# Patient Record
Sex: FEMALE | Race: WHITE | NOT HISPANIC OR LATINO | Employment: FULL TIME | URBAN - METROPOLITAN AREA
[De-identification: names, ages, dates, MRNs, and addresses within clinical notes are randomized per-mention and may not be internally consistent; named-entity substitution may affect disease eponyms.]

---

## 2018-06-25 ENCOUNTER — TRANSFERRED RECORDS (OUTPATIENT)
Dept: HEALTH INFORMATION MANAGEMENT | Facility: CLINIC | Age: 48
End: 2018-06-25

## 2018-06-25 LAB — PAP-ABSTRACT: NORMAL

## 2018-06-26 ENCOUNTER — TRANSFERRED RECORDS (OUTPATIENT)
Dept: HEALTH INFORMATION MANAGEMENT | Facility: CLINIC | Age: 48
End: 2018-06-26

## 2019-11-21 ENCOUNTER — OFFICE VISIT (OUTPATIENT)
Dept: FAMILY MEDICINE | Facility: CLINIC | Age: 49
End: 2019-11-21
Payer: COMMERCIAL

## 2019-11-21 VITALS
TEMPERATURE: 97.8 F | HEIGHT: 67 IN | RESPIRATION RATE: 16 BRPM | HEART RATE: 73 BPM | WEIGHT: 180.8 LBS | SYSTOLIC BLOOD PRESSURE: 118 MMHG | BODY MASS INDEX: 28.38 KG/M2 | DIASTOLIC BLOOD PRESSURE: 79 MMHG

## 2019-11-21 DIAGNOSIS — B35.4 DERMATOPHYTOSIS OF BODY: Primary | ICD-10-CM

## 2019-11-21 PROCEDURE — 99213 OFFICE O/P EST LOW 20 MIN: CPT | Performed by: FAMILY MEDICINE

## 2019-11-21 RX ORDER — FLUCONAZOLE 150 MG/1
150 TABLET ORAL ONCE
Qty: 1 TABLET | Refills: 0 | Status: SHIPPED | OUTPATIENT
Start: 2019-11-21 | End: 2019-11-21

## 2019-11-21 ASSESSMENT — PAIN SCALES - GENERAL: PAINLEVEL: NO PAIN (0)

## 2019-11-21 ASSESSMENT — MIFFLIN-ST. JEOR: SCORE: 1472.73

## 2019-11-21 NOTE — PROGRESS NOTES
"SUBJECTIVE:                                                    Malissa Erazo is a 49 year old female who presents to clinic today for the following health issues:    Chief Complaint   Patient presents with     Establish Care     Derm Problem     dry patch on scalp.       Concern - dry spot on scalp  Onset: a couple months    Description:   She says the spot is itchy and reminds her or psoriasis. She says it is raised a little bit.     Intensity: moderate    Progression of Symptoms:  Worsening slowly    Accompanying Signs & Symptoms:  none    Previous history of similar problem:   none    Precipitating factors:   Worsened by: none    Alleviating factors:  Improved by: possibly bacitracin. She says she would apply it before bed and maybe helped with the itching    Therapies Tried and outcome: possibly bacitracin. She says she would apply it before bed and maybe helped with the itching    Problem list and histories reviewed & adjusted, as indicated.  Additional history:     There is no problem list on file for this patient.    History reviewed. No pertinent surgical history.    Social History     Tobacco Use     Smoking status: Current Every Day Smoker     Smokeless tobacco: Never Used   Substance Use Topics     Alcohol use: Yes     Comment: occassional     History reviewed. No pertinent family history.      No current outpatient medications on file.     No Known Allergies    ROS:  Constitutional, HEENT, cardiovascular, pulmonary, gi and gu systems are negative, except as otherwise noted.    OBJECTIVE:                                                    /79   Pulse 73   Temp 97.8  F (36.6  C) (Tympanic)   Resp 16   Ht 1.702 m (5' 7\")   Wt 82 kg (180 lb 12.8 oz)   BMI 28.32 kg/m   Body mass index is 28.32 kg/m .   GENERAL: healthy, alert, well nourished, well hydrated, no distress  HENT: ear canals- normal; TMs- normal; Nose- normal; Mouth- no ulcers, no lesions  NECK: no tenderness, no adenopathy, no " asymmetry, no masses, no stiffness; thyroid- normal to palpation  RESP: lungs clear to auscultation - no rales, no rhonchi, no wheezes  CV: regular rates and rhythm, normal S1 S2, no S3 or S4 and no murmur, no click or rub -  ABDOMEN: soft, no tenderness, no  hepatosplenomegaly, no masses, normal bowel sounds  Skin has red raised edges rash back of neck.       ASSESSMENT/PLAN:                                                      (B35.4) Dermatophytosis of body  (primary encounter diagnosis)  Plan: fluconazole (DIFLUCAN) 150 MG tablet    Patient Instructions   Lamisil cream twice  A day for 3 weeks.          reports that she has been smoking. She has never used smokeless tobacco.  Tobacco Cessation Action Plan: Information offered: Patient not interested at this time          [facility]

## 2019-12-30 ENCOUNTER — TELEPHONE (OUTPATIENT)
Dept: FAMILY MEDICINE | Facility: CLINIC | Age: 49
End: 2019-12-30

## 2019-12-30 DIAGNOSIS — Z12.11 SPECIAL SCREENING FOR MALIGNANT NEOPLASMS, COLON: Primary | ICD-10-CM

## 2020-02-13 ENCOUNTER — TELEPHONE (OUTPATIENT)
Dept: FAMILY MEDICINE | Facility: CLINIC | Age: 50
End: 2020-02-13

## 2024-11-21 ENCOUNTER — OFFICE VISIT (OUTPATIENT)
Dept: FAMILY MEDICINE | Facility: CLINIC | Age: 54
End: 2024-11-21
Payer: COMMERCIAL

## 2024-11-21 ENCOUNTER — E-CONSULT (OUTPATIENT)
Dept: UROLOGY | Facility: CLINIC | Age: 54
End: 2024-11-21

## 2024-11-21 ENCOUNTER — TELEPHONE (OUTPATIENT)
Dept: FAMILY MEDICINE | Facility: CLINIC | Age: 54
End: 2024-11-21

## 2024-11-21 VITALS
WEIGHT: 194.6 LBS | BODY MASS INDEX: 30.54 KG/M2 | OXYGEN SATURATION: 98 % | DIASTOLIC BLOOD PRESSURE: 84 MMHG | RESPIRATION RATE: 16 BRPM | SYSTOLIC BLOOD PRESSURE: 139 MMHG | HEIGHT: 67 IN | HEART RATE: 81 BPM | TEMPERATURE: 97.7 F

## 2024-11-21 DIAGNOSIS — Z12.31 VISIT FOR SCREENING MAMMOGRAM: ICD-10-CM

## 2024-11-21 DIAGNOSIS — R10.31 RLQ ABDOMINAL PAIN: ICD-10-CM

## 2024-11-21 DIAGNOSIS — R93.429 ABNORMAL COMPUTED TOMOGRAPHY OF KIDNEY: Primary | ICD-10-CM

## 2024-11-21 DIAGNOSIS — R93.429 ABNORMAL CT SCAN, KIDNEY: Primary | ICD-10-CM

## 2024-11-21 DIAGNOSIS — Z12.11 SCREEN FOR COLON CANCER: ICD-10-CM

## 2024-11-21 NOTE — PROGRESS NOTES
11/21/2024     E-Consult has been accepted.    Interprofessional consultation requested by:  Luzma Hope PA-C      Clinical Question/Purpose: MY CLINICAL QUESTION IS: abnormal incidental finding on CT , no symptoms, wondering if needs further f/u imaging or workup? Report read ? Microabscesses but didn't fit clinical picture. Patient has no symptoms now.     Patient assessment and information reviewed: reviewed CT report and ER notes, the CT is not in PACS    1.  Abnormal appearance of the right kidney, which shows heterogeneous enhancement. There are fluid density areas within the periphery of the upper pole and lower pole. Small microabscesses in the setting of pyelonephritis cannot be excluded. Recommend correlation with urinalysis. If there is abnormal urinalysis and the patient is diagnosed with pyelonephritis and fails to respond to treatment, reassessment with CT is recommended to exclude progressive renal abscess.   2.  Appendix is normal.   3.  Scattered colonic diverticula without diverticulitis.     Recommendations:   Reassuring that the patient is asymptomatic at this time. [place] UA at time of ER visit but this does not totally rule out pyelonephritis, but given that she improved without abx probably not the case. On the differential diagnosis of heterogenous enhancement of the kidney with peripheral lesions is embolic disease, it does not appear this patient has history of afib or clotting disorder    recommend repeat contrasted imaging in about 1-2 weeks from now (CT urogram) to reassess the kidneys. If totally normal then no further followup needed but if persistent abnormality get formal urologic consultation      The recommendations provided in this E-Consult are based on a review of clinical data pertinent to the clinical question presented, without a review of the patient's complete medical record or, the benefit of a comprehensive in-person or virtual patient evaluation. This  consultation should not replace the clinical judgement and evaluation of the provider ordering this E-Consult. Any new clinical issues, or changes in patient status since the filing of this E-Consult will need to be taken into account when assessing these recommendations. Please contact me if you have further questions.    My total time spent reviewing clinical information and formulating assessment was 5 minutes.        Ankush Barrett MD

## 2024-11-21 NOTE — PROGRESS NOTES
Assessment & Plan     Abnormal CT scan, kidney  See hpi  I will f/u with e-consult and if anything further is recommended  Will recheck creatinine at physical she is going to schedule physical as overdue and will come fasting as well  - Adult E-Consult to Urology (Outpt Provider to Specialist Written Question & Response)    RLQ abdominal pain  Resolved, incidental abnormal ct finding    Visit for screening mammogram    - MA Screening Bilateral w/ Lauro; Future    Screen for colon cancer    - Colonoscopy Screening  Referral; Future    The longitudinal plan of care for the diagnosis(es)/condition(s) as documented were addressed during this visit. Due to the added complexity in care, I will continue to support Malissa in the subsequent management and with ongoing continuity of care.        Subjective   Malissa is a 54 year old, presenting for the following health issues:  Hospital F/U        11/21/2024    11:11 AM   Additional Questions   Roomed by Chayito CORDERO CMA   Accompanied by alone         11/21/2024    11:11 AM   Patient Reported Additional Medications   Patient reports taking the following new medications None     HPI     Mammogram or colon screening or pap done recently? No is overdue. Would like orders for this.   Used to go to [facility]. No recent pcp.     Here for er visit. Feels good today.     ER Follow-up Visit:    Hospital/Nursing Home/IP Rehab Facility: [facility] Emergency [facility]   Date of Admission: 11/14/24  Date of Discharge: 11/15/24  Reason(s) for Admission:   1. Abdominal pain, unspecified abdominal location R10.9   2. Kidney lesion N28.9   Was the patient in the ICU or did the patient experience delirium during hospitalization?  No  Do you have any other stressors you would like to discuss with your provider? No    Problems taking medications regularly:  NA  Medication changes since discharge: None  Problems adhering to non-medication therapy:  None    Summary of hospitalization:   "CareEverywhere information obtained and reviewed  Diagnostic Tests/Treatments reviewed.  Follow up needed: unsure regarding ct, did econsult to urology for guidance with this  Other Healthcare Providers Involved in Patient s Care:         None  Update since discharge: improved.     Had bloating/abdominal pain and cramping the day of. Went into er because she was worried about appendicitis per patient. Went away after leaving the ER. Never had any flank pain or urinary symptoms or fever. No diarrhea or other symptoms.     Plan of care communicated with patient      Copied from er notes:  \"Malissa Erazo is a 54 y.o. female who presents to the emergency department for evaluation of abdominal pain. She reports having an onset of sharp right lower quadrant abdominal pain earlier today around 1300, accompanied by some nausea. She states that her nausea has since subsided, but now has more generalized cramping abdominal pain. She notes feeling well prior to this pain. She notes that she researched on [employer] and became concerned about her appendix. She denies any dysuria, hematuria, urgency, frequency, vomiting. She feels that her symptoms have significantly improved. Has not noted any rashes. She denies chest pain or shortness of breath.   [facility]   Malissa Erazo is a 54 y.o. female who presents to the emergency department for evaluation of abdominal pain.    Vital signs largely unremarkable. Abdominal exam benign. She has had symptoms for less than 11 hours. Labs were obtained and were largely unremarkable; creatinine and bicarbonate noted. UA unrevealing. She was sent for CT imaging that revealed no evidence of appendicitis (her main concern). Incidentally was noted to have some lesions in the right kidney. She has had no urinary symptoms. She has no CVA tenderness. She has had no unexplained fever. She felt that the symptoms have improved.    At this time the etiology of her abdominal pain is unclear. She is not having " "any abdominal tenderness at this time and there is no evidence of appendicitis. No evidence of bowel obstruction, perforated viscus, intestinal inflammation. History, presentation and exam are not consistent with ovarian torsion. She is not having any urinary symptoms (UA sent from triage). We did discuss the possibility that the area within the right kidney could be micro abscesses but currently this does not fit with her presentation. We will have her follow-up with a primary provider; however, she understands to return if she develops fever, shaking chills, unusual rash, flank pain. This information was discussed with the patient and she is in agreement with this plan.    Disposition   The patient was discharged.    Diagnosis   ENCOUNTER DIAGNOSES   ICD-10-CM   1. Abdominal pain, unspecified abdominal location R10.9     2. Kidney lesion N28.9 \"    CT of kidney:  1. Abnormal appearance of the right kidney, which shows heterogeneous enhancement. There are fluid density areas within the periphery of the upper pole and lower pole. Small microabscesses in the setting of pyelonephritis cannot be excluded. Recommend correlation with urinalysis. If there is abnormal urinalysis and the patient is diagnosed with pyelonephritis and fails to respond to treatment, reassessment with CT is recommended to exclude progressive renal abscess. 2. Appendix is normal. 3. Scattered colonic diverticula without diverticulitis.   I, Ankush Mckeon, am serving as a scribe to document services personally performed by Angelito Peng MD, based on my observations and the provider's statements to me.    11/14/2024  [facility] EMERGENCY ROOM         Objective    /84   Pulse 81   Temp 97.7  F (36.5  C) (Temporal)   Resp 16   Ht 1.702 m (5' 7\")   Wt 88.3 kg (194 lb 9.6 oz)   SpO2 98%   BMI 30.48 kg/m    Body mass index is 30.48 kg/m .  Physical Exam   GENERAL: alert and no distress  RESP: lungs clear to auscultation - no rales, " rhonchi or wheezes  CV: regular rate and rhythm, normal S1 S2, no S3 or S4, no murmur, click or rub, no peripheral edema  MS: no gross musculoskeletal defects noted, no edema  PSYCH: mentation appears normal, affect normal/bright  Abdomen: no flank pain or tenderness          Signed Electronically by: Luzma Hope PA-C

## 2024-11-21 NOTE — TELEPHONE ENCOUNTER
I called and spoke to patient, relayed provider's message regarding CT urogram.    She will call to set up.    Iliana FRIAS RN  [facility] Triage

## 2024-11-21 NOTE — TELEPHONE ENCOUNTER
PLEASE CALL PATIENT:   Urologist got back to me on e-consult. They reviewed your chart. They recommend doing a CT urogram (with contrast) to look and see if the abnormality persists in about 2 weeks from now. If it does they recommend I refer you to them formally, but if this imaging is normal then no further workup needed. I ordered this for you. Call imaging to schedule  at [phone] or [phone] Toll-free      Luzma Hope PA-C

## 2024-12-02 ENCOUNTER — ANCILLARY PROCEDURE (OUTPATIENT)
Dept: CT IMAGING | Facility: CLINIC | Age: 54
End: 2024-12-02
Attending: PHYSICIAN ASSISTANT
Payer: COMMERCIAL

## 2024-12-02 DIAGNOSIS — R93.429 ABNORMAL CT SCAN, KIDNEY: ICD-10-CM

## 2024-12-02 PROCEDURE — 74178 CT ABD&PLV WO CNTR FLWD CNTR: CPT | Mod: TC | Performed by: RADIOLOGY

## 2024-12-02 RX ORDER — IOPAMIDOL 755 MG/ML
100 INJECTION, SOLUTION INTRAVASCULAR ONCE
Status: COMPLETED | OUTPATIENT
Start: 2024-12-02 | End: 2024-12-02

## 2024-12-02 RX ADMIN — IOPAMIDOL 100 ML: 755 INJECTION, SOLUTION INTRAVASCULAR at 13:08

## 2024-12-03 DIAGNOSIS — R93.429 ABNORMAL CT SCAN, KIDNEY: Primary | ICD-10-CM

## 2024-12-03 NOTE — RESULT ENCOUNTER NOTE
PLEASE CALL PATIENT:  Dear Malissa,      It was a pleasure to see you at your recent office visit.  Your test results are listed below.  Kidney imaging  again showed the mild patchy enhancement of your right kidney.  Per our e-consult, as abnormality has persisted I have referred you to urology for a more formal evaluation per their recommendations.      If you have any questions or concerns, please call the clinic at [phone].    Sincerely,  Luzma Hope PA-C

## 2024-12-12 ENCOUNTER — TELEPHONE (OUTPATIENT)
Dept: GASTROENTEROLOGY | Facility: CLINIC | Age: 54
End: 2024-12-12
Payer: COMMERCIAL

## 2024-12-12 NOTE — TELEPHONE ENCOUNTER
"Endoscopy Scheduling Screen    Have you had any respiratory illness or flu-like symptoms in the last 10 days?  No      What is your communication preference for Instructions and/or Bowel Prep?   MyChart      What insurance is in the chart?  Other:  [employer]ompson    Ordering/Referring Provider: NIURKA CORNELL   (If ordering provider performs procedure, schedule with ordering provider unless otherwise instructed. )    BMI: Estimated body mass index is 30.48 kg/m  as calculated from the following:    Height as of 11/21/24: 1.702 m (5' 7\").    Weight as of 11/21/24: 88.3 kg (194 lb 9.6 oz).       Sedation Ordered  moderate sedation.   If patient BMI > 50 do not schedule in [facility].    If patient BMI > 45 do not schedule at [facility].    Are you taking methadone or Suboxone?  NO, No RN review required.    Have you been diagnosed and are being treated for severe PTSD or severe anxiety?  NO, No RN review required.    Are you taking any prescription medications for pain 3 or more times per week?   NO, No RN review required.      Do you have a history of malignant hyperthermia?  No      (Females) Are you currently pregnant?        Have you been diagnosed or told you have pulmonary hypertension?   No      Do you have an LVAD?  No    Have you been told you have moderate to severe sleep apnea?  No.      Have you been told you have COPD, asthma, or any other lung disease?  No      Do you have any heart conditions?  No       Have you ever had or are you waiting for an organ transplant?  No. Continue scheduling, no site restrictions.      Have you had a stroke or transient ischemic attack (TIA aka \"mini stroke\" in the last 6 months?   No    Have you been diagnosed with or been told you have cirrhosis of the liver?   No.      Are you currently on dialysis?   No    Do you need assistance transferring?   No    BMI: Estimated body mass index is 30.48 kg/m  as calculated from the following:    Height as of 11/21/24: 1.702 m (5' 7\").    Weight as " of 11/21/24: 88.3 kg (194 lb 9.6 oz).     Is patients BMI > 40 and scheduling location [facility]?  No    Do you take an injectable or oral medication for weight loss or diabetes (excluding insulin)?  No      Do you take the medication Naltrexone?  No      Do you take blood thinners?  No       Prep   Are you currently on dialysis or do you have chronic kidney disease?  No      Do you have a diagnosis of diabetes?  No      Do you have a diagnosis of cystic fibrosis (CF)?  No    On a regular basis do you go 3 -5 days between bowel movements?  No      BMI > 40?  No    Preferred Pharmacy:    [id number] - [place], [place] - [address]  [address]  [facility] [address]  Phone: [phone] Fax: [phone]      Final Scheduling Details     Procedure scheduled  Colonoscopy    Surgeon:  BRIAN    Date of procedure:  1/3/25     Pre-OP / PAC:   No - Not required for this site.    Location  [facility] - [facility] - Patient preference.    Sedation   Moderate Sedation - Per order.      Patient Reminders:   You will receive a call from a Nurse to review instructions and health history.  This assessment must be completed prior to your procedure.  Failure to complete the Nurse assessment may result in the procedure being cancelled.      On the day of your procedure, please designate an adult(s) who can drive you home stay with you for the next 24 hours. The medicines used in the exam will make you sleepy. You will not be able to drive.      You cannot take public transportation, ride share services, or non-medical taxi service without a responsible caregiver.  Medical transport services are allowed with the requirement that a responsible caregiver will receive you at your destination.  We require that drivers and caregivers are confirmed prior to your procedure.

## 2024-12-16 ENCOUNTER — TELEPHONE (OUTPATIENT)
Dept: GASTROENTEROLOGY | Facility: CLINIC | Age: 54
End: 2024-12-16
Payer: COMMERCIAL

## 2024-12-16 NOTE — TELEPHONE ENCOUNTER
Pre assessment completed for upcoming procedure.   (Please see previous telephone encounter notes for complete details)        Procedure details:    Approximate time and facility location reviewed.   Patient is aware that endoscopy team will be calling about 2 days prior to confirm arrival time.    Designated  policy reviewed and that  requests drivers to check in and stay on campus.   *Disclaimer - please notify the  RN GI staff with any  issues/concerns.     Instructed to have someone stay 6  hours post procedure.     Medication review:    Medications reviewed. Please see supporting documentation below. Holding recommendations discussed (if applicable).       Prep for procedure:     Procedure prep instructions reviewed.        Any additional information needed:  N/A      Patient verbalized understanding and had no questions or concerns at this time.      Bouchra Garcia LPN  Endoscopy Procedure Pre Assessment   [phone] option 2

## 2024-12-16 NOTE — TELEPHONE ENCOUNTER
Pre visit planning completed.      Procedure details:    Patient scheduled for Colonoscopy on 1/3/25.     Approximate arrival time: 1340. Procedure time 1425.   *Ensure patient is aware that endoscopy team will be calling about 2 days prior to procedure date to confirm arrival time as this may change.     Facility location: [facility]; [address], [place] [address]. Check in location: 2nd Floor at Surgery desk.  *Disclaimer: Drivers are to check in with patient and stay on campus during procedure.     Sedation type: Conscious sedation     Pre op exam needed? No.    Indication for procedure:   Screen for colon cancer [Z         Chart review:     Electronic implanted devices? No    Recent diagnosis of diverticulitis within the last 6 weeks? No      Medication review:    Diabetic? No    Anticoagulants? No    Weight loss medication/injectable? No GLP-1 medication per patient's medication list. Nursing to verify with pre-assessment call.    Other medication HOLDING recommendations:  N/A      Prep for procedure:     Bowel prep recommendation: Standard Miralax.   Due to: standard bowel prep    Procedure information and instructions sent via Mobile Authentication         Corinne Kliber, RN  Endoscopy Procedure Pre Assessment   [phone] option 2

## 2024-12-19 ENCOUNTER — OFFICE VISIT (OUTPATIENT)
Dept: FAMILY MEDICINE | Facility: CLINIC | Age: 54
End: 2024-12-19
Payer: COMMERCIAL

## 2024-12-19 VITALS
DIASTOLIC BLOOD PRESSURE: 84 MMHG | OXYGEN SATURATION: 99 % | RESPIRATION RATE: 16 BRPM | HEIGHT: 67 IN | BODY MASS INDEX: 30.7 KG/M2 | SYSTOLIC BLOOD PRESSURE: 128 MMHG | TEMPERATURE: 97.9 F | WEIGHT: 195.6 LBS | HEART RATE: 87 BPM

## 2024-12-19 DIAGNOSIS — Z13.29 SCREENING FOR THYROID DISORDER: ICD-10-CM

## 2024-12-19 DIAGNOSIS — Z00.00 ROUTINE GENERAL MEDICAL EXAMINATION AT A HEALTH CARE FACILITY: Primary | ICD-10-CM

## 2024-12-19 DIAGNOSIS — Z12.2 SCREENING FOR LUNG CANCER: ICD-10-CM

## 2024-12-19 DIAGNOSIS — Z11.59 NEED FOR HEPATITIS C SCREENING TEST: ICD-10-CM

## 2024-12-19 DIAGNOSIS — Z11.4 SCREENING FOR HIV (HUMAN IMMUNODEFICIENCY VIRUS): ICD-10-CM

## 2024-12-19 DIAGNOSIS — Z12.4 CERVICAL CANCER SCREENING: ICD-10-CM

## 2024-12-19 DIAGNOSIS — E34.9 HORMONE IMBALANCE: ICD-10-CM

## 2024-12-19 DIAGNOSIS — Z13.220 SCREENING CHOLESTEROL LEVEL: ICD-10-CM

## 2024-12-19 DIAGNOSIS — Z87.891 FORMER SMOKER: ICD-10-CM

## 2024-12-19 DIAGNOSIS — Z87.42 HX OF ABNORMAL CERVICAL PAP SMEAR: ICD-10-CM

## 2024-12-19 DIAGNOSIS — E56.9 VITAMIN DEFICIENCY: ICD-10-CM

## 2024-12-19 DIAGNOSIS — Z13.1 SCREENING FOR DIABETES MELLITUS: ICD-10-CM

## 2024-12-19 DIAGNOSIS — B35.1 ONYCHOMYCOSIS DUE TO DERMATOPHYTE: ICD-10-CM

## 2024-12-19 LAB
ALBUMIN SERPL BCG-MCNC: 4.3 G/DL (ref 3.5–5.2)
ALP SERPL-CCNC: 96 U/L (ref 40–150)
ALT SERPL W P-5'-P-CCNC: 16 U/L (ref 0–50)
ANION GAP SERPL CALCULATED.3IONS-SCNC: 11 MMOL/L (ref 7–15)
AST SERPL W P-5'-P-CCNC: 23 U/L (ref 0–45)
BILIRUB SERPL-MCNC: 0.4 MG/DL
BUN SERPL-MCNC: 11.5 MG/DL (ref 6–20)
CALCIUM SERPL-MCNC: 9.5 MG/DL (ref 8.8–10.4)
CHLORIDE SERPL-SCNC: 108 MMOL/L (ref 98–107)
CHOLEST SERPL-MCNC: 218 MG/DL
CREAT SERPL-MCNC: 0.95 MG/DL (ref 0.51–0.95)
EGFRCR SERPLBLD CKD-EPI 2021: 70 ML/MIN/1.73M2
ESTRADIOL SERPL-MCNC: <5 PG/ML
FASTING STATUS PATIENT QL REPORTED: YES
FASTING STATUS PATIENT QL REPORTED: YES
FSH SERPL IRP2-ACNC: 58.8 MIU/ML
GLUCOSE SERPL-MCNC: 93 MG/DL (ref 70–99)
HCO3 SERPL-SCNC: 23 MMOL/L (ref 22–29)
HCV AB SERPL QL IA: NONREACTIVE
HDLC SERPL-MCNC: 68 MG/DL
HIV 1+2 AB+HIV1 P24 AG SERPL QL IA: NONREACTIVE
IRON BINDING CAPACITY (ROCHE): 276 UG/DL (ref 240–430)
IRON SATN MFR SERPL: 38 % (ref 15–46)
IRON SERPL-MCNC: 105 UG/DL (ref 37–145)
LDLC SERPL CALC-MCNC: 129 MG/DL
MAGNESIUM SERPL-MCNC: 2.3 MG/DL (ref 1.7–2.3)
NONHDLC SERPL-MCNC: 150 MG/DL
POTASSIUM SERPL-SCNC: 4.1 MMOL/L (ref 3.4–5.3)
PROGEST SERPL-MCNC: 0.3 NG/ML
PROT SERPL-MCNC: 6.8 G/DL (ref 6.4–8.3)
SHBG SERPL-SCNC: 59 NMOL/L (ref 30–135)
SODIUM SERPL-SCNC: 142 MMOL/L (ref 135–145)
TRIGL SERPL-MCNC: 105 MG/DL
TSH SERPL DL<=0.005 MIU/L-ACNC: 1.71 UIU/ML (ref 0.3–4.2)
VIT B12 SERPL-MCNC: 397 PG/ML (ref 232–1245)
VIT D+METAB SERPL-MCNC: 27 NG/ML (ref 20–50)

## 2024-12-19 RX ORDER — TERBINAFINE HYDROCHLORIDE 250 MG/1
250 TABLET ORAL DAILY
Qty: 90 TABLET | Refills: 0 | Status: SHIPPED | OUTPATIENT
Start: 2024-12-19 | End: 2025-03-19

## 2024-12-19 SDOH — HEALTH STABILITY: PHYSICAL HEALTH: ON AVERAGE, HOW MANY DAYS PER WEEK DO YOU ENGAGE IN MODERATE TO STRENUOUS EXERCISE (LIKE A BRISK WALK)?: 0 DAYS

## 2024-12-19 ASSESSMENT — SOCIAL DETERMINANTS OF HEALTH (SDOH): HOW OFTEN DO YOU GET TOGETHER WITH FRIENDS OR RELATIVES?: ONCE A WEEK

## 2024-12-19 NOTE — PROGRESS NOTES
Preventive Care Visit  [facility] THANIA PEREZ, Family Medicine  Dec 19, 2024      Assessment & Plan     Routine general medical examination at a health care facility      Cervical cancer screening    - HPV and Gynecologic Cytology Panel - Recommended Age 30 - 65 Years    Screening for HIV (human immunodeficiency virus)    - HIV Antigen Antibody Combo; Future  - HIV Antigen Antibody Combo    Need for hepatitis C screening test    - Hepatitis C Screen Reflex to HCV RNA Quant and Genotype; Future  - Hepatitis C Screen Reflex to HCV RNA Quant and Genotype    Hormone imbalance    - Estradiol; Future  - Testosterone Free and Total; Future  - Follicle stimulating hormone; Future  - Progesterone; Future  - Progesterone  - Follicle stimulating hormone  - Testosterone Free and Total  - Estradiol    Vitamin deficiency    - Vitamin D Deficiency; Future  - Vitamin B12; Future  - Magnesium; Future  - Iron and iron binding capacity; Future  - Iron and iron binding capacity  - Magnesium  - Vitamin B12  - Vitamin D Deficiency    Screening for thyroid disorder    - TSH with free T4 reflex; Future  - TSH with free T4 reflex    Screening cholesterol level    - Lipid panel reflex to direct LDL Non-fasting; Future  - Lipid panel reflex to direct LDL Non-fasting    Screening for diabetes mellitus    - Comprehensive metabolic panel (BMP + Alb, Alk Phos, ALT, AST, Total. Bili, TP); Future  - Comprehensive metabolic panel (BMP + Alb, Alk Phos, ALT, AST, Total. Bili, TP)    Onychomycosis due to dermatophyte    - Comprehensive metabolic panel (BMP + Alb, Alk Phos, ALT, AST, Total. Bili, TP); Future  - terbinafine (LAMISIL) 250 MG tablet; Take 1 tablet (250 mg) by mouth daily.  - Comprehensive metabolic panel (BMP + Alb, Alk Phos, ALT, AST, Total. Bili, TP)    Former smoker    - CT Chest Lung Cancer Scrn Low Dose wo; Future    Screening for lung cancer    - CT Chest Lung Cancer Scrn Low Dose wo;  "Future    Patient has been advised of split billing requirements and indicates understanding: Yes        BMI  Estimated body mass index is 30.81 kg/m  as calculated from the following:    Height as of this encounter: 1.697 m (5' 6.81\").    Weight as of this encounter: 88.7 kg (195 lb 9.6 oz).   Weight management plan: Discussed healthy diet and exercise guidelines    Counseling  Appropriate preventive services were addressed with this patient via screening, questionnaire, or discussion as appropriate for fall prevention, nutrition, physical activity, Tobacco-use cessation, social engagement, weight loss and cognition.  Checklist reviewing preventive services available has been given to the patient.  Reviewed patient's diet, addressing concerns and/or questions.   The patient was instructed to see the dentist every 6 months.       Work on weight loss  Regular exercise  See Patient Instructions    Subjective   Malissa is a 54 year old, presenting for the following:  Physical        12/19/2024     9:16 AM   Additional Questions   Roomed by MP         12/19/2024     9:16 AM   Patient Reported Additional Medications   Patient reports taking the following new medications None per patient          She works from home, she likes her job.  She has 2 grown kids, daughter recently .  Mental health good.  Enjoys gardening.  In agreement to hep B and pneumonia vaccine.  In agreement to screening labs and would like below labs done.  In agreement to Pap history of abnormal Pap.  Quit smoking 5 years ago, smoked 20 to 30 years.    HPI  Patient would still like to discuss the following concern(s):  1. Toe fungus  2. Labs-vitamins and hormones          12/19/2024   General Health   How would you rate your overall physical health? Good   Feel stress (tense, anxious, or unable to sleep) Only a little   (!) STRESS CONCERN      12/19/2024   Nutrition   Three or more servings of calcium each day? (!) I DON'T KNOW   Diet: I don't " know   How many servings of fruit and vegetables per day? (!) 2-3   How many sweetened beverages each day? 0-1         12/19/2024   Exercise   Days per week of moderate/strenous exercise 0 days   (!) EXERCISE CONCERN      12/19/2024   Social Factors   Frequency of gathering with friends or relatives Once a week   Worry food won't last until get money to buy more No   Food not last or not have enough money for food? No   Do you have housing? (Housing is defined as stable permanent housing and does not include staying ouside in a car, in a tent, in an abandoned building, in an overnight shelter, or couch-surfing.) Yes   Are you worried about losing your housing? No   Lack of transportation? No   Unable to get utilities (heat,electricity)? No         12/19/2024   Fall Risk   Fallen 2 or more times in the past year? No   Trouble with walking or balance? No          12/19/2024   Dental   Dentist two times every year? (!) NO         12/19/2024   TB Screening   Were you born outside of the [place]? No           Today's PHQ-2 Score:       11/21/2024    11:09 AM   PHQ-2 ( 1999 [employer])   Q1: Little interest or pleasure in doing things 1   Q2: Feeling down, depressed or hopeless 0   PHQ-2 Score 1    Q1: Little interest or pleasure in doing things Several days   Q2: Feeling down, depressed or hopeless Not at all   PHQ-2 Score 1       Patient-reported         12/19/2024   Substance Use   Alcohol more than 3/day or more than 7/wk No   Do you use any other substances recreationally? No     Social History     Tobacco Use    Smoking status: Former     Types: Cigarettes     Passive exposure: Past    Smokeless tobacco: Never   Vaping Use    Vaping status: Never Used   Substance Use Topics    Alcohol use: Yes     Comment: occassional    Drug use: Never          Mammogram Screening - Mammogram every 1-2 years updated in Health Maintenance based on mutual decision making          12/19/2024   One time HIV Screening   Previous HIV test? No          12/19/2024   STI Screening   New sexual partner(s) since last STI/HIV test? No     History of abnormal Pap smear: YES - reflected in Problem List and Health Maintenance accordingly        6/25/2018    12:00 AM   PAP / HPV   PAP-ABSTRACT See Scanned Document           This result is from an external source.     ASCVD Risk   The ASCVD Risk score (López LUCAS, et al., 2019) failed to calculate for the following reasons:    Cannot find a previous HDL lab    Cannot find a previous total cholesterol lab           Reviewed and updated as needed this visit by Provider                    History reviewed. No pertinent past medical history.  History reviewed. No pertinent surgical history.  OB History   No obstetric history on file.     Lab work is in process  Labs reviewed in EPIC  BP Readings from Last 3 Encounters:   12/19/24 128/84   11/21/24 139/84   11/21/19 118/79    Wt Readings from Last 3 Encounters:   12/19/24 88.7 kg (195 lb 9.6 oz)   11/21/24 88.3 kg (194 lb 9.6 oz)   11/21/19 82 kg (180 lb 12.8 oz)                  There is no problem list on file for this patient.    History reviewed. No pertinent surgical history.    Social History     Tobacco Use    Smoking status: Former     Types: Cigarettes     Passive exposure: Past    Smokeless tobacco: Never   Substance Use Topics    Alcohol use: Yes     Comment: occassional     Family History   Problem Relation Age of Onset    Hypertension Mother     Hyperlipidemia Mother     Hypertension Maternal Grandmother     Hyperlipidemia Brother     Cerebrovascular Disease Brother          Current Outpatient Medications   Medication Sig Dispense Refill    terbinafine (LAMISIL) 250 MG tablet Take 1 tablet (250 mg) by mouth daily. 90 tablet 0     No Known Allergies  No lab results found.       Review of Systems  CONSTITUTIONAL: NEGATIVE for fever, chills, change in weight  INTEGUMENTARY/SKIN: NEGATIVE for worrisome rashes, moles or lesions  EYES: NEGATIVE for vision  "changes or irritation  ENT/MOUTH: NEGATIVE for ear, mouth and throat problems  RESP: NEGATIVE for significant cough or SOB  BREAST: NEGATIVE for masses, tenderness or discharge  CV: NEGATIVE for chest pain, palpitations or peripheral edema  GI: NEGATIVE for nausea, abdominal pain, heartburn, or change in bowel habits  : NEGATIVE for frequency, dysuria, or hematuria  MUSCULOSKELETAL: NEGATIVE for significant arthralgias or myalgia  NEURO: NEGATIVE for weakness, dizziness or paresthesias  ENDOCRINE: NEGATIVE for temperature intolerance, skin/hair changes  HEME: NEGATIVE for bleeding problems  PSYCHIATRIC: NEGATIVE for changes in mood or affect     Objective    Exam  /84   Pulse 87   Temp 97.9  F (36.6  C) (Temporal)   Resp 16   Ht 1.697 m (5' 6.81\")   Wt 88.7 kg (195 lb 9.6 oz)   SpO2 99%   BMI 30.81 kg/m     Estimated body mass index is 30.81 kg/m  as calculated from the following:    Height as of this encounter: 1.697 m (5' 6.81\").    Weight as of this encounter: 88.7 kg (195 lb 9.6 oz).    Physical Exam  GENERAL: alert and no distress  EYES: Eyes grossly normal to inspection, PERRL and conjunctivae and sclerae normal  HENT: ear canals and TM's normal, nose and mouth without ulcers or lesions  NECK: no adenopathy, no asymmetry, masses, or scars  RESP: lungs clear to auscultation - no rales, rhonchi or wheezes; discussed lung cancer screening  BREAST: Deferred per guidelines-asymptomatic per patient.  Discussed self breast exam, symptoms to watch out for and when to follow-up.  Has mammogram scheduled today  CV: regular rate and rhythm, normal S1 S2, no S3 or S4, no murmur, click or rub, no peripheral edema  ABDOMEN: soft, nontender, no hepatosplenomegaly, no masses and bowel sounds normal ;has colonoscopy scheduled   (female): normal female external genitalia, normal urethral meatus, normal vaginal mucosa  MS: no gross musculoskeletal defects noted, no edema, no CVA tenderness  SKIN: no suspicious " lesions or rashes POSITIVE thickened, discolored right great nail she would prefer to treat with oral medication.  Discussed liver function test  NEURO: Normal strength and tone, cranial nerves intact, mentation intact and speech normal  PSYCH: mentation appears normal, affect normal/bright  LYMPH: no cervical, supraclavicular, axillary, or inguinal adenopathy        Signed Electronically by: THANIA WELLER

## 2024-12-19 NOTE — PATIENT INSTRUCTIONS
Make sure to complete your mammogram and colonoscopy. If lung cancer screening is covered, please complete.     Patient Education   Preventive Care Advice   This is general advice given by our system to help you stay healthy. However, your care team may have specific advice just for you. Please talk to your care team about your preventive care needs.  Nutrition  Eat 5 or more servings of fruits and vegetables each day.  Try wheat bread, brown rice and whole grain pasta (instead of white bread, rice, and pasta).  Get enough calcium and vitamin D. Check the label on foods and aim for 100% of the RDA (recommended daily allowance).  Lifestyle  Exercise at least 150 minutes each week  (30 minutes a day, 5 days a week).  Do muscle strengthening activities 2 days a week. These help control your weight and prevent disease.  No smoking.  Wear sunscreen to prevent skin cancer.  Have a dental exam and cleaning every 6 months.  Yearly exams  See your health care team every year to talk about:  Any changes in your health.  Any medicines your care team has prescribed.  Preventive care, family planning, and ways to prevent chronic diseases.  Shots (vaccines)   HPV shots (up to age 26), if you've never had them before.  Hepatitis B shots (up to age 59), if you've never had them before.  COVID-19 shot: Get this shot when it's due.  Flu shot: Get a flu shot every year.  Tetanus shot: Get a tetanus shot every 10 years.  Pneumococcal, hepatitis A, and RSV shots: Ask your care team if you need these based on your risk.  Shingles shot (for age 50 and up)  General health tests  Diabetes screening:  Starting at age 35, Get screened for diabetes at least every 3 years.  If you are younger than age 35, ask your care team if you should be screened for diabetes.  Cholesterol test: At age 39, start having a cholesterol test every 5 years, or more often if advised.  Bone density scan (DEXA): At age 50, ask your care team if you should have this  scan for osteoporosis (brittle bones).  Hepatitis C: Get tested at least once in your life.  STIs (sexually transmitted infections)  Before age 24: Ask your care team if you should be screened for STIs.  After age 24: Get screened for STIs if you're at risk. You are at risk for STIs (including HIV) if:  You are sexually active with more than one person.  You don't use condoms every time.  You or a partner was diagnosed with a sexually transmitted infection.  If you are at risk for HIV, ask about PrEP medicine to prevent HIV.  Get tested for HIV at least once in your life, whether you are at risk for HIV or not.  Cancer screening tests  Cervical cancer screening: If you have a cervix, begin getting regular cervical cancer screening tests starting at age 21.  Breast cancer scan (mammogram): If you've ever had breasts, begin having regular mammograms starting at age 40. This is a scan to check for breast cancer.  Colon cancer screening: It is important to start screening for colon cancer at age 45.  Have a colonoscopy test every 10 years (or more often if you're at risk) Or, ask your provider about stool tests like a FIT test every year or Cologuard test every 3 years.  To learn more about your testing options, visit:   .  For help making a decision, visit:   https://bit.ly/xm09815.  Prostate cancer screening test: If you have a prostate, ask your care team if a prostate cancer screening test (PSA) at age 55 is right for you.  Lung cancer screening: If you are a current or former smoker ages 50 to 80, ask your care team if ongoing lung cancer screenings are right for you.  For informational purposes only. Not to replace the advice of your health care provider. Copyright   2023 [employer]. All rights reserved. Clinically reviewed by the [facility] Transitions Program. [employer] [id number] - REV 01/24.

## 2024-12-21 LAB
TESTOST FREE SERPL-MCNC: 0.17 NG/DL
TESTOST SERPL-MCNC: 14 NG/DL (ref 8–60)

## 2024-12-26 LAB
BKR AP ASSOCIATED HPV REPORT: NORMAL
BKR LAB AP GYN ADEQUACY: NORMAL
BKR LAB AP GYN INTERPRETATION: NORMAL
BKR LAB AP PREVIOUS ABNL DX: NORMAL
BKR LAB AP PREVIOUS ABNORMAL: NORMAL
PATH REPORT.COMMENTS IMP SPEC: NORMAL
PATH REPORT.COMMENTS IMP SPEC: NORMAL
PATH REPORT.RELEVANT HX SPEC: NORMAL

## 2024-12-30 ENCOUNTER — ANCILLARY PROCEDURE (OUTPATIENT)
Dept: CT IMAGING | Facility: CLINIC | Age: 54
End: 2024-12-30
Attending: NURSE PRACTITIONER
Payer: COMMERCIAL

## 2024-12-30 DIAGNOSIS — Z12.2 SCREENING FOR LUNG CANCER: ICD-10-CM

## 2024-12-30 DIAGNOSIS — Z87.891 FORMER SMOKER: ICD-10-CM

## 2024-12-30 PROCEDURE — 71271 CT THORAX LUNG CANCER SCR C-: CPT | Mod: TC | Performed by: RADIOLOGY

## 2024-12-31 DIAGNOSIS — E04.1 THYROID NODULE: Primary | ICD-10-CM

## 2024-12-31 NOTE — RESULT ENCOUNTER NOTE
Rupert Leonardo,    Thank you for your recent office visit.    Here are your recent results.  Per radiology:                                                                  IMPRESSION:   1. ACR Assessment Category:  Lung-RADS Category 2. Benign appearance  or behavior. Recommendation: Continue annual screening, if clinically  relevant (please order exam code IMG 2290). .   2. Significant Incidental Finding(s):  Category S: Yes. Left lobe  thyroid nodule measuring 1.5 cm. Recommend thyroid ultrasound.  3. Mildly dilated ascending thoracic aorta measuring 3.9 cm.        Thyroid ultrasound ordered please schedule    Feel free to contact me via [employer] or call the clinic at [phone].    Sincerely,    Gin Grande, BENEDICTO, FNP-BC

## 2025-01-03 ENCOUNTER — HOSPITAL ENCOUNTER (OUTPATIENT)
Facility: AMBULATORY SURGERY CENTER | Age: 55
Discharge: HOME | End: 2025-01-03
Attending: INTERNAL MEDICINE | Admitting: INTERNAL MEDICINE
Payer: COMMERCIAL

## 2025-01-03 VITALS
RESPIRATION RATE: 16 BRPM | TEMPERATURE: 97.1 F | OXYGEN SATURATION: 98 % | HEART RATE: 77 BPM | DIASTOLIC BLOOD PRESSURE: 76 MMHG | SYSTOLIC BLOOD PRESSURE: 115 MMHG

## 2025-01-03 LAB — COLONOSCOPY: NORMAL

## 2025-01-03 PROCEDURE — G8918 PT W/O PREOP ORDER IV AB PRO: HCPCS

## 2025-01-03 PROCEDURE — G8907 PT DOC NO EVENTS ON DISCHARG: HCPCS

## 2025-01-03 PROCEDURE — 45380 COLONOSCOPY AND BIOPSY: CPT

## 2025-01-03 PROCEDURE — 88305 TISSUE EXAM BY PATHOLOGIST: CPT | Performed by: PATHOLOGY

## 2025-01-03 RX ORDER — FLUMAZENIL 0.1 MG/ML
0.2 INJECTION, SOLUTION INTRAVENOUS
Status: DISCONTINUED | OUTPATIENT
Start: 2025-01-03 | End: 2025-01-04 | Stop reason: HOSPADM

## 2025-01-03 RX ORDER — PROCHLORPERAZINE MALEATE 10 MG
10 TABLET ORAL EVERY 6 HOURS PRN
Status: DISCONTINUED | OUTPATIENT
Start: 2025-01-03 | End: 2025-01-04 | Stop reason: HOSPADM

## 2025-01-03 RX ORDER — NALOXONE HYDROCHLORIDE 0.4 MG/ML
0.2 INJECTION, SOLUTION INTRAMUSCULAR; INTRAVENOUS; SUBCUTANEOUS
Status: DISCONTINUED | OUTPATIENT
Start: 2025-01-03 | End: 2025-01-04 | Stop reason: HOSPADM

## 2025-01-03 RX ORDER — ONDANSETRON 2 MG/ML
4 INJECTION INTRAMUSCULAR; INTRAVENOUS
Status: DISCONTINUED | OUTPATIENT
Start: 2025-01-03 | End: 2025-01-04 | Stop reason: HOSPADM

## 2025-01-03 RX ORDER — ONDANSETRON 4 MG/1
4 TABLET, ORALLY DISINTEGRATING ORAL EVERY 6 HOURS PRN
Status: DISCONTINUED | OUTPATIENT
Start: 2025-01-03 | End: 2025-01-04 | Stop reason: HOSPADM

## 2025-01-03 RX ORDER — ONDANSETRON 2 MG/ML
4 INJECTION INTRAMUSCULAR; INTRAVENOUS EVERY 6 HOURS PRN
Status: DISCONTINUED | OUTPATIENT
Start: 2025-01-03 | End: 2025-01-04 | Stop reason: HOSPADM

## 2025-01-03 RX ORDER — NALOXONE HYDROCHLORIDE 0.4 MG/ML
0.4 INJECTION, SOLUTION INTRAMUSCULAR; INTRAVENOUS; SUBCUTANEOUS
Status: DISCONTINUED | OUTPATIENT
Start: 2025-01-03 | End: 2025-01-04 | Stop reason: HOSPADM

## 2025-01-03 RX ORDER — FENTANYL CITRATE 50 UG/ML
INJECTION, SOLUTION INTRAMUSCULAR; INTRAVENOUS PRN
Status: DISCONTINUED | OUTPATIENT
Start: 2025-01-03 | End: 2025-01-03 | Stop reason: HOSPADM

## 2025-01-03 RX ORDER — LIDOCAINE 40 MG/G
CREAM TOPICAL
Status: DISCONTINUED | OUTPATIENT
Start: 2025-01-03 | End: 2025-01-04 | Stop reason: HOSPADM

## 2025-01-07 ENCOUNTER — ANCILLARY PROCEDURE (OUTPATIENT)
Dept: ULTRASOUND IMAGING | Facility: CLINIC | Age: 55
End: 2025-01-07
Attending: NURSE PRACTITIONER
Payer: COMMERCIAL

## 2025-01-07 DIAGNOSIS — E04.1 THYROID NODULE: ICD-10-CM

## 2025-01-07 LAB
PATH REPORT.COMMENTS IMP SPEC: NORMAL
PATH REPORT.COMMENTS IMP SPEC: NORMAL
PATH REPORT.FINAL DX SPEC: NORMAL
PATH REPORT.GROSS SPEC: NORMAL
PATH REPORT.MICROSCOPIC SPEC OTHER STN: NORMAL
PATH REPORT.RELEVANT HX SPEC: NORMAL
PHOTO IMAGE: NORMAL

## 2025-01-07 PROCEDURE — 76536 US EXAM OF HEAD AND NECK: CPT | Mod: TC | Performed by: RADIOLOGY

## 2025-01-07 NOTE — RESULT ENCOUNTER NOTE
Rupert Leonardo,    Thank you for your recent office visit.    Here are your recent results.  Per radiology: IMPRESSION:  1.  Multiple bilateral thyroid nodules as above, largest on the left meets criteria for fine-needle aspiration if not previously performed.  2.  Recommend annual sonographic follow-up for additional bilateral nodules.      Do you want me to order the fine needle aspiration?  It looks like you had one done in 2011?    Feel free to contact me via [employer] or call the clinic at [phone].    Sincerely,    Gin Grande, BENEDICTO, FNP-BC

## 2025-01-08 ENCOUNTER — TELEPHONE (OUTPATIENT)
Dept: FAMILY MEDICINE | Facility: CLINIC | Age: 55
End: 2025-01-08
Payer: COMMERCIAL

## 2025-01-08 DIAGNOSIS — E04.1 THYROID NODULE: Primary | ICD-10-CM

## 2025-01-08 NOTE — TELEPHONE ENCOUNTER
RN spoke with the patient.     She would like a fine needle aspiration order.     Aminata Cruz RN on 1/8/2025 at 11:26 AM

## 2025-01-08 NOTE — TELEPHONE ENCOUNTER
Rupert Leonardo,     Thank you for your recent office visit.     Here are your recent results.  Per radiology: IMPRESSION:  1.  Multiple bilateral thyroid nodules as above, largest on the left meets criteria for fine-needle aspiration if not previously performed.  2.  Recommend annual sonographic follow-up for additional bilateral nodules.        Do you want me to order the fine needle aspiration?  It looks like you had one done in 2011?     Feel free to contact me via [email or web address] or call the clinic at [phone].     Sincerely,     Gin Grande, APRN, FNP-BC   Written by Gin Virk NP on 1/7/2025  3:13 PM CST

## 2025-01-14 ENCOUNTER — OFFICE VISIT (OUTPATIENT)
Dept: UROLOGY | Facility: CLINIC | Age: 55
End: 2025-01-14
Attending: PHYSICIAN ASSISTANT
Payer: COMMERCIAL

## 2025-01-14 VITALS — DIASTOLIC BLOOD PRESSURE: 87 MMHG | HEART RATE: 77 BPM | SYSTOLIC BLOOD PRESSURE: 124 MMHG | OXYGEN SATURATION: 100 %

## 2025-01-14 DIAGNOSIS — R93.429 ABNORMAL CT SCAN, KIDNEY: ICD-10-CM

## 2025-01-14 DIAGNOSIS — Z87.440 PERSONAL HISTORY OF URINARY TRACT INFECTION: Primary | ICD-10-CM

## 2025-01-14 PROCEDURE — 99243 OFF/OP CNSLTJ NEW/EST LOW 30: CPT | Performed by: UROLOGY

## 2025-01-14 NOTE — PROGRESS NOTES
S: Patient is a pleasant 55-year-old  female who was requested to be seen by Luzma Hope for a consultation with regard to patient's abnormal CT scan.   Patient had some right lower abdominal pain several months ago and was seen emergency room for it.  Patient had a CT scan that showed possible right sided pyelonephritis.  She denies any flank pain, nausea, or vomiting.  She has no fever.  Urinalysis and CBC were normal.  Recently a  CT urogram was done.  She has history of UTIs when she was younger.  Current Outpatient Medications   Medication Sig Dispense Refill    terbinafine (LAMISIL) 250 MG tablet Take 1 tablet (250 mg) by mouth daily. 90 tablet 0     No Known Allergies  No past medical history on file.  Past Surgical History:   Procedure Laterality Date    COLONOSCOPY N/A 1/3/2025    Procedure: Colonoscopy;  Surgeon: Kia Cathernie DO;  Location: [facility] OR    COLONOSCOPY N/A 1/3/2025    Procedure: COLONOSCOPY, WITH POLYPECTOMY AND BIOPSY;  Surgeon: Kia Catherine DO;  Location: [facility] OR      Family History   Problem Relation Age of Onset    Hypertension Mother     Hyperlipidemia Mother     Hypertension Maternal Grandmother     Hyperlipidemia Brother     Cerebrovascular Disease Brother      Social History     Socioeconomic History    Marital status:      Spouse name: None    Number of children: None    Years of education: None    Highest education level: None   Tobacco Use    Smoking status: Former     Types: Cigarettes     Passive exposure: Past    Smokeless tobacco: Never   Vaping Use    Vaping status: Never Used   Substance and Sexual Activity    Alcohol use: Yes     Comment: occassional    Drug use: Never    Sexual activity: Not Currently     Partners: Male     Birth control/protection: None   Other Topics Concern    Parent/sibling w/ CABG, MI or angioplasty before 65F 55M? No     Social Drivers of Health     Financial Resource Strain: Low Risk  (12/19/2024)    Financial Resource Strain      Within the past 12 months, have you or your family members you live with been unable to get utilities (heat, electricity) when it was really needed?: No   Food Insecurity: Low Risk  (12/19/2024)    Food Insecurity     Within the past 12 months, did you worry that your food would run out before you got money to buy more?: No     Within the past 12 months, did the food you bought just not last and you didn t have money to get more?: No   Transportation Needs: Low Risk  (12/19/2024)    Transportation Needs     Within the past 12 months, has lack of transportation kept you from medical appointments, getting your medicines, non-medical meetings or appointments, work, or from getting things that you need?: No   Physical Activity: Unknown (12/19/2024)    Exercise Vital Sign     Days of Exercise per Week: 0 days   Stress: No Stress Concern Present (12/19/2024)    [place] [employer] - Occupational Stress Questionnaire     Feeling of Stress : Only a little   Social Connections: Unknown (12/19/2024)    Social Connection and Isolation Panel [NHANES]     Frequency of Social Gatherings with Friends and Family: Once a week   Interpersonal Safety: Low Risk  (1/3/2025)    Interpersonal Safety     Do you feel physically and emotionally safe where you currently live?: Yes     Within the past 12 months, have you been hit, slapped, kicked or otherwise physically hurt by someone?: No     Within the past 12 months, have you been humiliated or emotionally abused in other ways by your partner or ex-partner?: No   Housing Stability: Low Risk  (12/19/2024)    Housing Stability     Do you have housing? : Yes     Are you worried about losing your housing?: No       REVIEW OF SYSTEMS  =================  C: NEGATIVE for fever, chills, change in weight  I: NEGATIVE for worrisome rashes, moles or lesions  E/M: NEGATIVE for ear, mouth and throat problems  R: NEGATIVE for significant cough or SHORTNESS OF BREATH  CV:  NEGATIVE  for chest pain, palpitations or peripheral edema  GI: NEGATIVE for nausea, abdominal pain, heartburn, or change in bowel habits  NEURO: NEGATIVE numbness/weakness  : see HPI  PSYCH: NEGATIVE depression/anxiety  LYmph: no new enlarged lymph nodes  Ortho: no new trauma/movements      Physical Exam:  /87   Pulse 77   SpO2 100%    Patient is pleasant, in no acute distress, good general condition.  GENERAL: alert and no distress  EYES: Eyes grossly normal to inspection.  No discharge or erythema, or obvious scleral/conjunctival abnormalities.  RESP: No audible wheeze, cough, or visible cyanosis.    SKIN: Visible skin clear. No significant rash, abnormal pigmentation or lesions.  NEURO: Cranial nerves grossly intact.  Mentation and speech appropriate for age.  PSYCH: Appropriate affect, tone, and pace of words     CT scan reviewed    Assessment/Plan:   Pleasant 55-year-old  female with abnormal CT scan of the right kidney.  She has history of UTI when she was young.  CT scan reviewed.  I suspect this is most likely due to cortical from previous infections scarring rather than active infection.  Discussed careful monitoring of her blood pressure.  She was reassured.

## 2025-01-22 ENCOUNTER — HOSPITAL ENCOUNTER (OUTPATIENT)
Dept: ULTRASOUND IMAGING | Facility: CLINIC | Age: 55
Discharge: HOME | End: 2025-01-22
Attending: NURSE PRACTITIONER
Payer: COMMERCIAL

## 2025-01-22 VITALS — DIASTOLIC BLOOD PRESSURE: 94 MMHG | SYSTOLIC BLOOD PRESSURE: 141 MMHG

## 2025-01-22 DIAGNOSIS — E04.1 THYROID NODULE: ICD-10-CM

## 2025-01-22 PROCEDURE — 250N000009 HC RX 250: Performed by: RADIOLOGY

## 2025-01-22 PROCEDURE — 272N000431 US BIOPSY THYROID FINE NEEDLE ASPIRATION

## 2025-01-22 RX ADMIN — LIDOCAINE HYDROCHLORIDE 4 ML: 10 INJECTION, SOLUTION EPIDURAL; INFILTRATION; INTRACAUDAL; PERINEURAL at 11:23

## (undated) DEVICE — KIT ENDO FIRST STEP DISINFECTANT 200ML W/POUCH EP-4

## (undated) DEVICE — PREP CHLORAPREP 26ML TINTED ORANGE  260815

## (undated) DEVICE — PAD CHUX UNDERPAD 23X24" 7136

## (undated) RX ORDER — FENTANYL CITRATE 50 UG/ML
INJECTION, SOLUTION INTRAMUSCULAR; INTRAVENOUS
Status: DISPENSED
Start: 2025-01-03